# Patient Record
Sex: MALE | URBAN - METROPOLITAN AREA
[De-identification: names, ages, dates, MRNs, and addresses within clinical notes are randomized per-mention and may not be internally consistent; named-entity substitution may affect disease eponyms.]

---

## 2022-10-06 ENCOUNTER — APPOINTMENT (OUTPATIENT)
Age: 42
Setting detail: DERMATOLOGY
End: 2022-10-06

## 2022-10-06 DIAGNOSIS — L738 OTHER SPECIFIED DISEASES OF HAIR AND HAIR FOLLICLES: ICD-10-CM

## 2022-10-06 DIAGNOSIS — L663 OTHER SPECIFIED DISEASES OF HAIR AND HAIR FOLLICLES: ICD-10-CM

## 2022-10-06 PROBLEM — L02.426 FURUNCLE OF LEFT LOWER LIMB: Status: ACTIVE | Noted: 2022-10-06

## 2022-10-06 PROBLEM — L02.425 FURUNCLE OF RIGHT LOWER LIMB: Status: ACTIVE | Noted: 2022-10-06

## 2022-10-06 PROCEDURE — 99203 OFFICE O/P NEW LOW 30 MIN: CPT

## 2022-10-06 PROCEDURE — OTHER COUNSELING: OTHER

## 2022-10-06 PROCEDURE — OTHER MIPS QUALITY: OTHER

## 2022-10-06 PROCEDURE — OTHER PRESCRIPTION: OTHER

## 2022-10-06 RX ORDER — BENZOYL PEROXIDE 100 MG/G
LOTION TOPICAL BID
Qty: 237 | Refills: 2 | Status: ERX | COMMUNITY
Start: 2022-10-06

## 2022-10-06 RX ORDER — CLINDAMYCIN PHOSPHATE 10 MG/ML
LOTION TOPICAL BID
Qty: 60 | Refills: 2 | Status: ERX | COMMUNITY
Start: 2022-10-06

## 2022-10-06 RX ORDER — DOXYCYCLINE 100 MG/1
CAPSULE ORAL QD
Qty: 30 | Refills: 2 | Status: ERX | COMMUNITY
Start: 2022-10-06

## 2022-10-06 ASSESSMENT — LOCATION DETAILED DESCRIPTION DERM
LOCATION DETAILED: LEFT ANTERIOR PROXIMAL THIGH
LOCATION DETAILED: RIGHT ANTERIOR DISTAL THIGH
LOCATION DETAILED: RIGHT ANTERIOR PROXIMAL THIGH
LOCATION DETAILED: LEFT ANTERIOR DISTAL THIGH

## 2022-10-06 ASSESSMENT — LOCATION ZONE DERM: LOCATION ZONE: LEG

## 2022-10-06 ASSESSMENT — LOCATION SIMPLE DESCRIPTION DERM
LOCATION SIMPLE: LEFT THIGH
LOCATION SIMPLE: RIGHT THIGH

## 2022-10-21 ENCOUNTER — RX ONLY (RX ONLY)
Age: 42
End: 2022-10-21

## 2022-10-21 RX ORDER — METRONIDAZOLE 7.5 MG/ML
0.75% LOTION TOPICAL BID
Qty: 59 | Refills: 0 | Status: ERX | COMMUNITY
Start: 2022-10-21

## 2022-11-17 ENCOUNTER — RX ONLY (RX ONLY)
Age: 42
End: 2022-11-17

## 2022-11-17 ENCOUNTER — APPOINTMENT (OUTPATIENT)
Age: 42
Setting detail: DERMATOLOGY
End: 2022-11-17

## 2022-11-17 DIAGNOSIS — L663 OTHER SPECIFIED DISEASES OF HAIR AND HAIR FOLLICLES: ICD-10-CM

## 2022-11-17 DIAGNOSIS — L738 OTHER SPECIFIED DISEASES OF HAIR AND HAIR FOLLICLES: ICD-10-CM

## 2022-11-17 PROBLEM — L02.426 FURUNCLE OF LEFT LOWER LIMB: Status: ACTIVE | Noted: 2022-11-17

## 2022-11-17 PROBLEM — L02.425 FURUNCLE OF RIGHT LOWER LIMB: Status: ACTIVE | Noted: 2022-11-17

## 2022-11-17 PROCEDURE — OTHER MIPS QUALITY: OTHER

## 2022-11-17 PROCEDURE — 99213 OFFICE O/P EST LOW 20 MIN: CPT

## 2022-11-17 PROCEDURE — OTHER PRESCRIPTION MEDICATION MANAGEMENT: OTHER

## 2022-11-17 PROCEDURE — OTHER COUNSELING: OTHER

## 2022-11-17 RX ORDER — METRONIDAZOLE 7.5 MG/ML
0.75% LOTION TOPICAL BID
Qty: 59 | Refills: 5 | Status: ERX | COMMUNITY
Start: 2022-11-17

## 2022-11-17 RX ORDER — DOXYCYCLINE HYCLATE 100 MG/1
100 CAPSULE, GELATIN COATED ORAL
Qty: 30 | Refills: 0 | Status: ERX | COMMUNITY
Start: 2022-11-17

## 2022-11-17 RX ORDER — BENZOYL PEROXIDE 100 MG/G
LOTION TOPICAL BID
Qty: 237 | Refills: 5 | Status: ERX

## 2022-11-17 ASSESSMENT — LOCATION DETAILED DESCRIPTION DERM
LOCATION DETAILED: RIGHT ANTERIOR DISTAL THIGH
LOCATION DETAILED: LEFT ANTERIOR DISTAL THIGH
LOCATION DETAILED: LEFT ANTERIOR PROXIMAL THIGH
LOCATION DETAILED: RIGHT ANTERIOR PROXIMAL THIGH

## 2022-11-17 ASSESSMENT — LOCATION SIMPLE DESCRIPTION DERM
LOCATION SIMPLE: LEFT THIGH
LOCATION SIMPLE: RIGHT THIGH

## 2022-11-17 ASSESSMENT — LOCATION ZONE DERM: LOCATION ZONE: LEG

## 2022-11-17 NOTE — PROCEDURE: PRESCRIPTION MEDICATION MANAGEMENT
Continue Regimen: Doxycycline,for the next month  bp, metronidazole
Detail Level: Zone
Render In Strict Bullet Format?: No

## 2023-06-23 RX ORDER — BENZOYL PEROXIDE 100 MG/G
LOTION TOPICAL BID
Qty: 237 | Refills: 2 | Status: ERX

## 2023-09-18 ENCOUNTER — APPOINTMENT (OUTPATIENT)
Dept: URBAN - METROPOLITAN AREA SURGERY 23 | Age: 43
Setting detail: DERMATOLOGY
End: 2023-09-18

## 2023-09-18 DIAGNOSIS — L738 OTHER SPECIFIED DISEASES OF HAIR AND HAIR FOLLICLES: ICD-10-CM

## 2023-09-18 DIAGNOSIS — L663 OTHER SPECIFIED DISEASES OF HAIR AND HAIR FOLLICLES: ICD-10-CM

## 2023-09-18 PROBLEM — L02.425 FURUNCLE OF RIGHT LOWER LIMB: Status: ACTIVE | Noted: 2023-09-18

## 2023-09-18 PROBLEM — L02.221 FURUNCLE OF ABDOMINAL WALL: Status: ACTIVE | Noted: 2023-09-18

## 2023-09-18 PROBLEM — L02.426 FURUNCLE OF LEFT LOWER LIMB: Status: ACTIVE | Noted: 2023-09-18

## 2023-09-18 PROCEDURE — OTHER COUNSELING: OTHER

## 2023-09-18 PROCEDURE — OTHER MIPS QUALITY: OTHER

## 2023-09-18 PROCEDURE — OTHER PRESCRIPTION: OTHER

## 2023-09-18 PROCEDURE — 99213 OFFICE O/P EST LOW 20 MIN: CPT

## 2023-09-18 RX ORDER — BENZOYL PEROXIDE 100 MG/G
10% LOTION TOPICAL BID
Qty: 237 | Refills: 2 | Status: ERX | COMMUNITY
Start: 2023-09-18

## 2023-09-18 RX ORDER — METRONIDAZOLE 7.5 MG/G
0.75% GEL TOPICAL QD
Qty: 45 | Refills: 2 | Status: ERX | COMMUNITY
Start: 2023-09-18

## 2023-09-18 RX ORDER — DOXYCYCLINE 50 MG/1
50 CAPSULE ORAL QD
Qty: 30 | Refills: 1 | Status: ERX | COMMUNITY
Start: 2023-09-18

## 2023-09-18 ASSESSMENT — LOCATION SIMPLE DESCRIPTION DERM
LOCATION SIMPLE: RIGHT THIGH
LOCATION SIMPLE: LEFT THIGH
LOCATION SIMPLE: ABDOMEN

## 2023-09-18 ASSESSMENT — LOCATION DETAILED DESCRIPTION DERM
LOCATION DETAILED: RIGHT ANTERIOR PROXIMAL THIGH
LOCATION DETAILED: RIGHT ANTERIOR DISTAL THIGH
LOCATION DETAILED: LEFT ANTERIOR PROXIMAL THIGH
LOCATION DETAILED: LEFT ANTERIOR DISTAL THIGH
LOCATION DETAILED: EPIGASTRIC SKIN

## 2023-09-18 ASSESSMENT — LOCATION ZONE DERM
LOCATION ZONE: LEG
LOCATION ZONE: TRUNK

## 2023-12-05 ENCOUNTER — RX ONLY (RX ONLY)
Age: 43
End: 2023-12-05

## 2023-12-05 RX ORDER — DOXYCYCLINE 50 MG/1
50 CAPSULE ORAL QD
Qty: 30 | Refills: 2 | Status: ERX

## 2024-03-18 ENCOUNTER — APPOINTMENT (OUTPATIENT)
Dept: URBAN - METROPOLITAN AREA SURGERY 23 | Age: 44
Setting detail: DERMATOLOGY
End: 2024-03-18

## 2024-03-18 DIAGNOSIS — L663 OTHER SPECIFIED DISEASES OF HAIR AND HAIR FOLLICLES: ICD-10-CM

## 2024-03-18 DIAGNOSIS — L738 OTHER SPECIFIED DISEASES OF HAIR AND HAIR FOLLICLES: ICD-10-CM

## 2024-03-18 PROBLEM — L02.425 FURUNCLE OF RIGHT LOWER LIMB: Status: ACTIVE | Noted: 2024-03-18

## 2024-03-18 PROBLEM — L02.221 FURUNCLE OF ABDOMINAL WALL: Status: ACTIVE | Noted: 2024-03-18

## 2024-03-18 PROBLEM — L02.426 FURUNCLE OF LEFT LOWER LIMB: Status: ACTIVE | Noted: 2024-03-18

## 2024-03-18 PROCEDURE — OTHER MIPS QUALITY: OTHER

## 2024-03-18 PROCEDURE — OTHER PRESCRIPTION: OTHER

## 2024-03-18 PROCEDURE — OTHER ADDITIONAL NOTES: OTHER

## 2024-03-18 PROCEDURE — 99213 OFFICE O/P EST LOW 20 MIN: CPT

## 2024-03-18 PROCEDURE — OTHER COUNSELING: OTHER

## 2024-03-18 RX ORDER — BENZOYL PEROXIDE 100 MG/G
10% LOTION TOPICAL BID
Qty: 237 | Refills: 4 | Status: ERX

## 2024-03-18 RX ORDER — METRONIDAZOLE 7.5 MG/G
0.75% GEL TOPICAL QD
Qty: 45 | Refills: 4 | Status: ERX

## 2024-03-18 ASSESSMENT — SEVERITY ASSESSMENT: SEVERITY: MILD

## 2024-03-18 ASSESSMENT — LOCATION DETAILED DESCRIPTION DERM
LOCATION DETAILED: LEFT ANTERIOR DISTAL THIGH
LOCATION DETAILED: RIGHT ANTERIOR PROXIMAL THIGH
LOCATION DETAILED: LEFT ANTERIOR PROXIMAL THIGH
LOCATION DETAILED: EPIGASTRIC SKIN
LOCATION DETAILED: RIGHT ANTERIOR DISTAL THIGH

## 2024-03-18 ASSESSMENT — LOCATION SIMPLE DESCRIPTION DERM
LOCATION SIMPLE: LEFT THIGH
LOCATION SIMPLE: RIGHT THIGH
LOCATION SIMPLE: ABDOMEN

## 2024-03-18 ASSESSMENT — LOCATION ZONE DERM
LOCATION ZONE: LEG
LOCATION ZONE: TRUNK

## 2024-03-18 ASSESSMENT — PAIN INTENSITY VAS: HOW INTENSE IS YOUR PAIN 0 BEING NO PAIN, 10 BEING THE MOST SEVERE PAIN POSSIBLE?: 1/10 PAIN

## 2024-03-18 NOTE — PROCEDURE: ADDITIONAL NOTES
Additional Notes: No recent flares, lesions are smaller
Detail Level: Simple
Render Risk Assessment In Note?: no

## 2024-03-18 NOTE — PROCEDURE: MIPS QUALITY
Detail Level: Detailed
Quality 130: Documentation Of Current Medications In The Medical Record: Current Medications Documented
Quality 431: Preventive Care And Screening: Unhealthy Alcohol Use - Screening: Patient not identified as an unhealthy alcohol user when screened for unhealthy alcohol use using a systematic screening method
Quality 226: Preventive Care And Screening: Tobacco Use: Screening And Cessation Intervention: Patient screened for tobacco use and is an ex/non-smoker
Quality 134: Screening For Clinical Depression And Follow-Up Plan: The patient was screened for depression and the screen was negative and no follow up required

## 2024-06-03 ENCOUNTER — RX ONLY (RX ONLY)
Age: 44
End: 2024-06-03

## 2024-06-03 RX ORDER — DOXYCYCLINE 50 MG/1
50 CAPSULE ORAL QD
Qty: 30 | Refills: 2 | Status: ERX

## 2024-09-09 ENCOUNTER — RX ONLY (RX ONLY)
Age: 44
End: 2024-09-09

## 2024-09-09 RX ORDER — BENZOYL PEROXIDE 100 MG/G
10% LOTION TOPICAL BID
Qty: 237 | Refills: 4 | Status: ERX

## 2024-09-09 RX ORDER — DOXYCYCLINE 50 MG/1
50 CAPSULE ORAL QD
Qty: 30 | Refills: 3 | Status: ERX

## 2024-09-09 RX ORDER — METRONIDAZOLE 7.5 MG/G
0.75% GEL TOPICAL QD
Qty: 45 | Refills: 4 | Status: ERX

## 2025-01-15 ENCOUNTER — RX ONLY (RX ONLY)
Age: 45
End: 2025-01-15

## 2025-01-15 RX ORDER — DOXYCYCLINE 50 MG/1
50 CAPSULE ORAL QD
Qty: 30 | Refills: 3 | Status: ERX